# Patient Record
(demographics unavailable — no encounter records)

---

## 2024-11-18 NOTE — HISTORY OF PRESENT ILLNESS
[FreeTextEntry1] :  47 M patient attempted to cut a piece of wood with a circular saw and accidentally cut through his finger. Patient presented to the emergency room where his evaluated.  Patient presented with amputated piece.  Patient was transferred to my office for definitive management.  Patient states he smokes marijuana but does not smoke cigarettes

## 2024-11-18 NOTE — ASSESSMENT
[FreeTextEntry1] : 47-year-old partial amputation of left thumb.  Explained to patient procedure in office will will not be the definitive procedure.  The full-thickness skin graft amputated segment will be placed over the wound which would allow granulation tissue to form.  Explained to the patient he will need further surgery in the operating room possible full-thickness skin graft after ulnar shortening versus Lisa flap.  Explained to the patient risks and benefits of the procedure.  Patient agrees and like to proceed with scheduling surgery.  A total of 1 hour was spent in the consult on the procedure for this patient.

## 2024-11-18 NOTE — PROCEDURE
[FreeTextEntry1] : Amputated left distal thumb [FreeTextEntry2] : Debridement of left thumb full-thickness skin graft to the left thumb wound [FreeTextEntry3] : Local 20 cc [FreeTextEntry4] : Minimal [FreeTextEntry5] : None [FreeTextEntry6] : Patient was prepped and draped in a sterile fashion.  On the back table the amputated thumb was defatted to be used as a full-thickness skin graft.  20 cc of lidocaine with epinephrine was given as a block to the left thumb.  The wound was vigorously irrigated with Betadine and normal saline.  The nailbed was excised.  The full-thickness skin graft of the amputated segment was then sutured to the wound with multiple 5-0 chromic sutures.  A bolster dressing was then fastened with 3-0 nylon sutures.  Patient tolerated procedure well.  All counts were correct at the end of the procedure.

## 2024-12-04 NOTE — PHYSICAL EXAM
[de-identified] : left thumb: partial amputation above IP joint, sutures in place, healing well, no open areas, no dusky parts of the skin, well perfusion, mild tenderness

## 2024-12-04 NOTE — ASSESSMENT
[FreeTextEntry1] : 47-year-old partial amputation of left thumb.  Explained to patient procedure in office will will not be the definitive procedure.  The full-thickness skin graft amputated segment will be placed over the wound which would allow granulation tissue to form.  Explained to the patient he will need further surgery in the operating room possible full-thickness skin graft after ulnar shortening versus Lisa flap.  Explained to the patient risks and benefits of the procedure.  Patient agrees and like to proceed with scheduling surgery.  A total of 1 hour was spent in the consult on the procedure for this patient.  POD#8 s/p L thumb wound exploration and lis w/local flap closure  - removed dressings, applied bacitracin/xeroform/stephanie - change dressings every other day - pathology results discussed - all questions answered - f/u 1 week for suture removal

## 2024-12-04 NOTE — DATA REVIEWED
[FreeTextEntry1] : ami Accession Number : [id number] Patient:     KINDRA JC   Accession:                             [id number]  Collected Date/Time:                   11/27/2024 14:11 EST Received Date/Time:                    11/29/2024 07:37 EST  Surgical Pathology Report - Auth (Verified)  Specimen(s) Submitted 1  Left thumb skin 2  Left thumb nail  Final Diagnosis 1  Left thumb skin, Debridement: -Skin and subcutis showing necrosis, ulcer, acute and chronic inflammation.   2  Left thumb nail: -Nail material and periungual tissue showing mild chronic inflammation. -Special stain (PAS ) failed to reveal fungal elements. Verified by: Brant Mendoza M.D. (Electronic Signature) Reported on: 12/03/24 17:46 EST, [place] [address] Phone: [phone]   Fax: [phone]

## 2024-12-11 NOTE — PHYSICAL EXAM
[de-identified] : NAD [de-identified] : nonlabored breathing  [de-identified] : left thumb: amputation stump distal to IP joint healing well, no open areas, no erythema, no ischemic changes, slight skin maceration in the center, FROM and intact sensation

## 2024-12-11 NOTE — DATA REVIEWED
[FreeTextEntry1] : ami Accession Number : [id number] Patient:     KINDRA JC   Accession:                             [id number]  Collected Date/Time:                   11/27/2024 14:11 EST Received Date/Time:                    11/29/2024 07:37 EST  Surgical Pathology Report - Auth (Verified)  Specimen(s) Submitted 1  Left thumb skin 2  Left thumb nail  Final Diagnosis 1  Left thumb skin, Debridement: -Skin and subcutis showing necrosis, ulcer, acute and chronic inflammation.   2  Left thumb nail: -Nail material and periungual tissue showing mild chronic inflammation. -Special stain (PAS ) failed to reveal fungal elements. Verified by: Brant Mendoza M.D. (Electronic Signature) Reported on: 12/03/24 17:46 EST, [place] [address] Phone: [phone]   Fax: [phone]

## 2024-12-11 NOTE — HISTORY OF PRESENT ILLNESS
[FreeTextEntry1] : 47 M patient attempted to cut a piece of wood with a circular saw and accidentally cut through his finger. Patient presented to the emergency room where his evaluated.  Patient presented with amputated piece.  Patient was transferred to my office for definitive management.  Patient states he smokes marijuana but does not smoke cigarettes  Interval hx (12/4/24): POD#8 s/p L thumb wound exploration and lis w/local flap closure. Doing well, having some pain in the thumb. Denies fever, N/V or chills  Interval hx (12/11/24): Pt is here POD#14 s/p L thumb wound exploration and debridement w/local flap closure. Doing well. Performing local wound care with Xeroform/Bacitracin and compliant with hand rest and elevation. Denies any f/c or drainage.

## 2024-12-11 NOTE — ASSESSMENT
[FreeTextEntry1] : 47-year-old s/p partial amputation of left thumb.    Now POD#14 s/p L thumb wound exploration and lis w/local flap closure. Doing well.   - every other suture removed, dressing changed with bacitracin/xeroform/stephanie - continue current local wound care  - hand rest and elevation - keep finger DRY - Rx Gabapentin for pain control  - pathology results discussed - all questions were answered - f/u 1 week for removal of remaining sutures and OHT referral if ready  Seen with Dr. Dover.   Saw patient with SAMANTHA OROZCO for .

## 2024-12-18 NOTE — ASSESSMENT
[FreeTextEntry1] : explained to pt he is getting the wound two wet explained to pt to keep wound dry pt can shower and wash wound but do not put moist gauze or xeroform on wound  pt will follow up to see progression of healing

## 2024-12-18 NOTE — HISTORY OF PRESENT ILLNESS
[FreeTextEntry1] : 47 M patient attempted to cut a piece of wood with a circular saw and accidentally cut through his finger. Patient presented to the emergency room where his evaluated. Patient presented with amputated piece. Patient was transferred to my office for definitive management. Patient states he smokes marijuana but does not smoke cigarettes  Interval hx (12/4/24): POD#8 s/p L thumb wound exploration and lis w/local flap closure. Doing well, having some pain in the thumb. Denies fever, N/V or chills  Interval hx (12/11/24): Pt is here POD#14 s/p L thumb wound exploration and debridement w/local flap closure. Doing well. Performing local wound care with Xeroform/Bacitracin and compliant with hand rest and elevation. Denies any f/c or drainage.  interval : pt doing well , sutures removed,

## 2025-01-15 NOTE — HISTORY OF PRESENT ILLNESS
[FreeTextEntry1] : 47 M patient attempted to cut a piece of wood with a circular saw and accidentally cut through his finger. Patient presented to the emergency room where his evaluated. Patient presented with amputated piece. Patient was transferred to my office for definitive management. Patient states he smokes marijuana but does not smoke cigarettes  Interval hx (12/4/24): POD#8 s/p L thumb wound exploration and lis w/local flap closure. Doing well, having some pain in the thumb. Denies fever, N/V or chills  Interval hx (12/11/24): Pt is here POD#14 s/p L thumb wound exploration and debridement w/local flap closure. Doing well. Performing local wound care with Xeroform/Bacitracin and compliant with hand rest and elevation. Denies any f/c or drainage.  interval : pt doing well , sutures removed,  interval hx: pt doing well wound has healed

## 2025-01-15 NOTE — ASSESSMENT
[FreeTextEntry1] : Pt doing well  wounds healing well OT referral for strengthen and help with daily activities PSCY eval to help pt cope with new diagnosis of amputated thumb

## 2025-02-06 NOTE — HISTORY OF PRESENT ILLNESS
[FreeTextEntry1] : 47 M patient attempted to cut a piece of wood with a circular saw and accidentally cut through his finger. Patient presented to the emergency room where his evaluated. Patient presented with amputated piece. Patient was transferred to my office for definitive management. Patient states he smokes marijuana but does not smoke cigarettes  Interval hx (12/4/24): POD#8 s/p L thumb wound exploration and lis w/local flap closure. Doing well, having some pain in the thumb. Denies fever, N/V or chills  Interval hx (12/11/24): Pt is here POD#14 s/p L thumb wound exploration and debridement w/local flap closure. Doing well. Performing local wound care with Xeroform/Bacitracin and compliant with hand rest and elevation. Denies any f/c or drainage.  interval : pt doing well , sutures removed,  interval hx: pt doing well wound has healed  interval hx 2/6/25 : pt doing well incision healed doing therapy

## 2025-02-06 NOTE — PHYSICAL EXAM
[de-identified] :  No acute distress, AOx3 [de-identified] :  EOMI, visual acuity intact, no diplopia [de-identified] : Non labored breathing, no acute distress or SOB noted [de-identified] : left thumb: fully healed surgical incision good sensation ulnar and radial side of digit

## 2025-05-21 NOTE — HISTORY OF PRESENT ILLNESS
[FreeTextEntry1] : 47 M patient attempted to cut a piece of wood with a circular saw and accidentally cut through his finger. Patient presented to the emergency room where his evaluated. Patient presented with amputated piece. Patient was transferred to my office for definitive management. Patient states he smokes marijuana but does not smoke cigarettes  Interval hx (12/4/24): POD#8 s/p L thumb wound exploration and lis w/local flap closure. Doing well, having some pain in the thumb. Denies fever, N/V or chills  Interval hx (12/11/24): Pt is here POD#14 s/p L thumb wound exploration and debridement w/local flap closure. Doing well. Performing local wound care with Xeroform/Bacitracin and compliant with hand rest and elevation. Denies any f/c or drainage.  interval : pt doing well , sutures removed,  interval hx: pt doing well wound has healed  interval hx 2/6/25 : pt doing well incision healed doing therapy  interval hx 5/21/25: increased sensative at ulnar border of  L thumb

## 2025-05-21 NOTE — PHYSICAL EXAM
[de-identified] :  No acute distress, AOx3 [de-identified] :  EOMI, visual acuity intact, no diplopia [de-identified] : Non labored breathing, no acute distress or SOB noted [de-identified] : left thumb: healed incision, prominence of residual bone at ulnar border  Split-Thickness Skin Graft Text: The defect edges were debeveled with a #15 scalpel blade.  Given the location of the defect, shape of the defect and the proximity to free margins a split thickness skin graft was deemed most appropriate.  Using a sterile surgical marker, the primary defect shape was transferred to the donor site. The split thickness graft was then harvested.  The skin graft was then placed in the primary defect and oriented appropriately.

## 2025-05-21 NOTE — ASSESSMENT
[FreeTextEntry1] : Patient seen and examined.  Patient healed very well.  Prominence of residual phalanx the ulnar border of the digit.  This is most likely causing him pain when he is using the digit.  Patient would warrant a revision amputation with shortening more of the bone.  Patient is not currently at goal arbitration's with employer.  Will follow-up with the patient in 1 month to see outcome.  At this time this patient does not have insurance we will possibly have to do sliding scale for the revision surgery.  Will follow-up in 1 month.

## 2025-06-27 NOTE — HISTORY OF PRESENT ILLNESS
[FreeTextEntry1] : 47 M patient attempted to cut a piece of wood with a circular saw and accidentally cut through his finger. Patient presented to the emergency room where his evaluated. Patient presented with amputated piece. Patient was transferred to my office for definitive management. Patient states he smokes marijuana but does not smoke cigarettes  Interval hx (12/4/24): POD#8 s/p L thumb wound exploration and lis w/local flap closure. Doing well, having some pain in the thumb. Denies fever, N/V or chills  Interval hx (12/11/24): Pt is here POD#14 s/p L thumb wound exploration and debridement w/local flap closure. Doing well. Performing local wound care with Xeroform/Bacitracin and compliant with hand rest and elevation. Denies any f/c or drainage.  interval : pt doing well , sutures removed, interval hx: pt doing well wound has healed interval hx 2/6/25 : pt doing well incision healed doing therapy interval hx 5/21/25: increased sensative at ulnar border of L thumb interval hx 6/27/25: pt seen and examined increased sensative at ulnar border of L thumb

## 2025-06-27 NOTE — ASSESSMENT
[FreeTextEntry1] : Will proceed with revision  - will trim bone and left thumb down -  -Procedure  was explained in great detail. Risk and benefits were explained to the patient. Risks including bleeding, infection, wound breakdown, injury to adjacent structures revision surgery. Pt understands risk and would like to proceed with surgery.

## 2025-06-27 NOTE — PHYSICAL EXAM
[de-identified] :  No acute distress, AOx3  [de-identified] :  EOMI, visual acuity intact, no diplopia  [de-identified] : Non labored breathing, no acute distress or SOB noted  [de-identified] : left thumb : healed incision increased pain at ulnar border